# Patient Record
Sex: FEMALE | Race: BLACK OR AFRICAN AMERICAN | Employment: FULL TIME | URBAN - METROPOLITAN AREA
[De-identification: names, ages, dates, MRNs, and addresses within clinical notes are randomized per-mention and may not be internally consistent; named-entity substitution may affect disease eponyms.]

---

## 2021-03-25 ENCOUNTER — HOSPITAL ENCOUNTER (EMERGENCY)
Age: 21
Discharge: HOME | End: 2021-03-25
Attending: EMERGENCY MEDICINE
Payer: COMMERCIAL

## 2021-03-25 ENCOUNTER — APPOINTMENT (OUTPATIENT)
Dept: VASCULAR SURGERY | Age: 21
End: 2021-03-25
Attending: PHYSICIAN ASSISTANT
Payer: COMMERCIAL

## 2021-03-25 VITALS
OXYGEN SATURATION: 99 % | TEMPERATURE: 98.1 F | HEIGHT: 67 IN | BODY MASS INDEX: 42.38 KG/M2 | RESPIRATION RATE: 15 BRPM | WEIGHT: 270 LBS | DIASTOLIC BLOOD PRESSURE: 75 MMHG | SYSTOLIC BLOOD PRESSURE: 117 MMHG | HEART RATE: 79 BPM

## 2021-03-25 DIAGNOSIS — S76.912A MUSCLE STRAIN OF LEFT THIGH, INITIAL ENCOUNTER: Primary | ICD-10-CM

## 2021-03-25 DIAGNOSIS — D64.9 ANEMIA, UNSPECIFIED TYPE: ICD-10-CM

## 2021-03-25 LAB
ALBUMIN SERPL-MCNC: 3 G/DL (ref 3.4–5)
ALBUMIN/GLOB SERPL: 0.7 {RATIO} (ref 0.8–1.7)
ALP SERPL-CCNC: 72 U/L (ref 45–117)
ALT SERPL-CCNC: 20 U/L (ref 13–56)
ANION GAP SERPL CALC-SCNC: 4 MMOL/L (ref 3–18)
APPEARANCE UR: ABNORMAL
AST SERPL-CCNC: 9 U/L (ref 10–38)
BACTERIA URNS QL MICRO: ABNORMAL /HPF
BASOPHILS # BLD: 0 K/UL (ref 0–0.1)
BASOPHILS NFR BLD: 0 % (ref 0–2)
BILIRUB SERPL-MCNC: 0.4 MG/DL (ref 0.2–1)
BILIRUB UR QL: NEGATIVE
BUN SERPL-MCNC: 12 MG/DL (ref 7–18)
BUN/CREAT SERPL: 13 (ref 12–20)
CALCIUM SERPL-MCNC: 8.9 MG/DL (ref 8.5–10.1)
CHLORIDE SERPL-SCNC: 106 MMOL/L (ref 100–111)
CO2 SERPL-SCNC: 28 MMOL/L (ref 21–32)
COLOR UR: YELLOW
CREAT SERPL-MCNC: 0.89 MG/DL (ref 0.6–1.3)
DIFFERENTIAL METHOD BLD: ABNORMAL
EOSINOPHIL # BLD: 0.1 K/UL (ref 0–0.4)
EOSINOPHIL NFR BLD: 1 % (ref 0–5)
EPITH CASTS URNS QL MICRO: ABNORMAL /LPF (ref 0–5)
ERYTHROCYTE [DISTWIDTH] IN BLOOD BY AUTOMATED COUNT: 16.2 % (ref 11.6–14.5)
GLOBULIN SER CALC-MCNC: 4.4 G/DL (ref 2–4)
GLUCOSE SERPL-MCNC: 89 MG/DL (ref 74–99)
GLUCOSE UR STRIP.AUTO-MCNC: NEGATIVE MG/DL
HCG UR QL: NEGATIVE
HCT VFR BLD AUTO: 31.1 % (ref 35–45)
HGB BLD-MCNC: 9.4 G/DL (ref 12–16)
HGB UR QL STRIP: NEGATIVE
KETONES UR QL STRIP.AUTO: NEGATIVE MG/DL
LEUKOCYTE ESTERASE UR QL STRIP.AUTO: ABNORMAL
LYMPHOCYTES # BLD: 2 K/UL (ref 0.9–3.6)
LYMPHOCYTES NFR BLD: 18 % (ref 21–52)
MCH RBC QN AUTO: 24.2 PG (ref 24–34)
MCHC RBC AUTO-ENTMCNC: 30.2 G/DL (ref 31–37)
MCV RBC AUTO: 80.2 FL (ref 74–97)
MONOCYTES # BLD: 1 K/UL (ref 0.05–1.2)
MONOCYTES NFR BLD: 9 % (ref 3–10)
NEUTS SEG # BLD: 8.1 K/UL (ref 1.8–8)
NEUTS SEG NFR BLD: 72 % (ref 40–73)
NITRITE UR QL STRIP.AUTO: NEGATIVE
PH UR STRIP: 7 [PH] (ref 5–8)
PLATELET # BLD AUTO: 314 K/UL (ref 135–420)
PMV BLD AUTO: 10.1 FL (ref 9.2–11.8)
POTASSIUM SERPL-SCNC: 4 MMOL/L (ref 3.5–5.5)
PROT SERPL-MCNC: 7.4 G/DL (ref 6.4–8.2)
PROT UR STRIP-MCNC: NEGATIVE MG/DL
RBC # BLD AUTO: 3.88 M/UL (ref 4.2–5.3)
RBC #/AREA URNS HPF: ABNORMAL /HPF (ref 0–5)
SODIUM SERPL-SCNC: 138 MMOL/L (ref 136–145)
SP GR UR REFRACTOMETRY: 1.02 (ref 1–1.03)
UROBILINOGEN UR QL STRIP.AUTO: 0.2 EU/DL (ref 0.2–1)
WBC # BLD AUTO: 11.3 K/UL (ref 4.6–13.2)
WBC URNS QL MICRO: ABNORMAL /HPF (ref 0–5)

## 2021-03-25 PROCEDURE — 81025 URINE PREGNANCY TEST: CPT

## 2021-03-25 PROCEDURE — 93971 EXTREMITY STUDY: CPT

## 2021-03-25 PROCEDURE — 74011250636 HC RX REV CODE- 250/636: Performed by: PHYSICIAN ASSISTANT

## 2021-03-25 PROCEDURE — 80053 COMPREHEN METABOLIC PANEL: CPT

## 2021-03-25 PROCEDURE — 85025 COMPLETE CBC W/AUTO DIFF WBC: CPT

## 2021-03-25 PROCEDURE — 81001 URINALYSIS AUTO W/SCOPE: CPT

## 2021-03-25 PROCEDURE — 96374 THER/PROPH/DIAG INJ IV PUSH: CPT

## 2021-03-25 PROCEDURE — 99284 EMERGENCY DEPT VISIT MOD MDM: CPT

## 2021-03-25 RX ORDER — METHOCARBAMOL 750 MG/1
750 TABLET, FILM COATED ORAL 4 TIMES DAILY
Qty: 16 TAB | Refills: 0 | Status: SHIPPED | OUTPATIENT
Start: 2021-03-25 | End: 2021-03-29

## 2021-03-25 RX ORDER — IBUPROFEN 600 MG/1
600 TABLET ORAL
Qty: 20 TAB | Refills: 0 | Status: SHIPPED | OUTPATIENT
Start: 2021-03-25

## 2021-03-25 RX ORDER — FERROUS SULFATE 325(65) MG
325 TABLET, DELAYED RELEASE (ENTERIC COATED) ORAL
Qty: 90 TAB | Refills: 0 | Status: SHIPPED | OUTPATIENT
Start: 2021-03-25 | End: 2021-04-24

## 2021-03-25 RX ORDER — KETOROLAC TROMETHAMINE 30 MG/ML
30 INJECTION, SOLUTION INTRAMUSCULAR; INTRAVENOUS
Status: COMPLETED | OUTPATIENT
Start: 2021-03-25 | End: 2021-03-25

## 2021-03-25 RX ADMIN — KETOROLAC TROMETHAMINE 30 MG: 30 INJECTION, SOLUTION INTRAMUSCULAR at 12:20

## 2021-03-25 NOTE — LETTER
[facility] EMERGENCY DEPT 
[address] [place] [address] 
[phone] 
 
Work/School Note 
 
Date: 3/25/2021 
 
To Whom It May concern: 
 
Romaine Dejesus was seen and treated today in the emergency room by the following provider(s): 
Attending Provider: Penny Randhawa DO 
Physician Assistant: Amos Hogan PA-C.   
 
Romaine Dejesus may return to work on 03/30/2021. 
 
Sincerely, 
 
 
 
 
Amos Hogan PA-C

## 2021-03-25 NOTE — ED PROVIDER NOTES
EMERGENCY DEPARTMENT HISTORY AND PHYSICAL EXAM    Date: 3/25/2021  Patient Name: Homero Terrell    History of Presenting Illness     Chief Complaint   Patient presents with    Leg Pain         History Provided By: Patient    Chief Complaint: leg pain    HPI(Context):   11:15 AM  Homero Terrell is a 23 y.o. female with PMHX of anemia who presents to the emergency department C/O left thigh. Pt reports constant cramping like pain in left thigh x one week. Pain is moderate and does not radiate. Pain worse with palpation and with movement of leg. No trauma. Pt had XR at  that was unremarkable. 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 Pt remarks she was told she was anemic on her blood work at [facility] but has not seen PCP. Pt denies fever, chills, abdominal pain, nausea, vomiting, leg swelling, CP, SOB, hx of DVT, recent travel, recent surgery, dx cancer, and any other sxs or complaints. Pt is nonsmoker. PCP: Darius Riley MD        Past History     Past Medical History:  No past medical history on file. Past Surgical History:  No past surgical history on file. Family History:  No family history on file. Social History:  Social History     Tobacco Use    Smoking status: Not on file   Substance Use Topics    Alcohol use: Not on file    Drug use: Not on file       Allergies:  No Known Allergies      Review of Systems   Review of Systems   Constitutional: Positive for fatigue (improved). Respiratory: Negative for shortness of breath. Cardiovascular: Negative for chest pain, palpitations and leg swelling. Genitourinary:        Pt endorses hx of heavy menses   Musculoskeletal: Positive for arthralgias and myalgias. Neurological: Negative for dizziness and light-headedness. Hematological: Does not bruise/bleed easily. All other systems reviewed and are negative.       Physical Exam     Vitals:    03/25/21 1107   BP: 128/70   Pulse: 88   Resp: 20   Temp: 98.1 °F (36.7 °C)   SpO2: 99% Weight: 122.5 kg (270 lb)   Height: 5' 7\" (1.702 m)     Physical Exam  Vitals signs and nursing note reviewed. Constitutional:       General: She is not in acute distress. Appearance: She is well-developed. She is not diaphoretic. Comments: AA female in NAD. Alert. Appears comfortable. HENT:      Head: Normocephalic and atraumatic. Right Ear: External ear normal.      Left Ear: External ear normal.      Nose: Nose normal.   Eyes:      General: No scleral icterus. Right eye: No discharge. Left eye: No discharge. Conjunctiva/sclera: Conjunctivae normal.   Neck:      Musculoskeletal: Normal range of motion. Cardiovascular:      Rate and Rhythm: Normal rate and regular rhythm. Pulses:           Dorsalis pedis pulses are 2+ on the right side and 2+ on the left side. Posterior tibial pulses are 2+ on the right side and 2+ on the left side. Heart sounds: Normal heart sounds. No murmur. No friction rub. No gallop. Pulmonary:      Effort: Pulmonary effort is normal. No tachypnea, accessory muscle usage or respiratory distress. Breath sounds: Normal breath sounds. No decreased breath sounds, wheezing, rhonchi or rales. Abdominal:      Palpations: Abdomen is soft. Musculoskeletal: Normal range of motion. Left hip: She exhibits normal range of motion, normal strength, no tenderness and no bony tenderness. Right upper leg: She exhibits no tenderness, no bony tenderness and no swelling. Left upper leg: She exhibits tenderness. She exhibits no bony tenderness, no swelling and no edema. Left lower leg: She exhibits no tenderness, no bony tenderness and no swelling. No edema. Legs:       Left foot: Normal range of motion. No tenderness, bony tenderness or swelling. Skin:     General: Skin is warm and dry. Neurological:      Mental Status: She is alert and oriented to person, place, and time. Mental status is at baseline. Psychiatric:         Behavior: Behavior normal.         Judgment: Judgment normal.             Diagnostic Study Results     Labs -     Recent Results (from the past 12 hour(s))   URINALYSIS W/ RFLX MICROSCOPIC    Collection Time: 03/25/21 12:13 PM   Result Value Ref Range    Color YELLOW      Appearance CLOUDY      Specific gravity 1.017 1.005 - 1.030      pH (UA) 7.0 5.0 - 8.0      Protein Negative NEG mg/dL    Glucose Negative NEG mg/dL    Ketone Negative NEG mg/dL    Bilirubin Negative NEG      Blood Negative NEG      Urobilinogen 0.2 0.2 - 1.0 EU/dL    Nitrites Negative NEG      Leukocyte Esterase SMALL (A) NEG     URINE MICROSCOPIC ONLY    Collection Time: 03/25/21 12:13 PM   Result Value Ref Range    WBC 4 to 10 0 - 5 /hpf    RBC 0 to 3 0 - 5 /hpf    Epithelial cells 2+ 0 - 5 /lpf    Bacteria 1+ (A) NEG /hpf   HCG URINE, QL. - POC    Collection Time: 03/25/21 12:14 PM   Result Value Ref Range    Pregnancy test,urine (POC) Negative NEG     CBC WITH AUTOMATED DIFF    Collection Time: 03/25/21 12:18 PM   Result Value Ref Range    WBC 11.3 4.6 - 13.2 K/uL    RBC 3.88 (L) 4.20 - 5.30 M/uL    HGB 9.4 (L) 12.0 - 16.0 g/dL    HCT 31.1 (L) 35.0 - 45.0 %    MCV 80.2 74.0 - 97.0 FL    MCH 24.2 24.0 - 34.0 PG    MCHC 30.2 (L) 31.0 - 37.0 g/dL    RDW 16.2 (H) 11.6 - 14.5 %    PLATELET 724 474 - 770 K/uL    MPV 10.1 9.2 - 11.8 FL    NEUTROPHILS 72 40 - 73 %    LYMPHOCYTES 18 (L) 21 - 52 %    MONOCYTES 9 3 - 10 %    EOSINOPHILS 1 0 - 5 %    BASOPHILS 0 0 - 2 %    ABS. NEUTROPHILS 8.1 (H) 1.8 - 8.0 K/UL    ABS. LYMPHOCYTES 2.0 0.9 - 3.6 K/UL    ABS. MONOCYTES 1.0 0.05 - 1.2 K/UL    ABS. EOSINOPHILS 0.1 0.0 - 0.4 K/UL    ABS.  BASOPHILS 0.0 0.0 - 0.1 K/UL    DF AUTOMATED     METABOLIC PANEL, COMPREHENSIVE    Collection Time: 03/25/21 12:18 PM   Result Value Ref Range    Sodium 138 136 - 145 mmol/L    Potassium 4.0 3.5 - 5.5 mmol/L    Chloride 106 100 - 111 mmol/L    CO2 28 21 - 32 mmol/L    Anion gap 4 3.0 - 18 mmol/L Glucose 89 74 - 99 mg/dL    BUN 12 7.0 - 18 MG/DL    Creatinine 0.89 0.6 - 1.3 MG/DL    BUN/Creatinine ratio 13 12 - 20      GFR est AA >60 >60 ml/min/1.73m2    GFR est non-AA >60 >60 ml/min/1.73m2    Calcium 8.9 8.5 - 10.1 MG/DL    Bilirubin, total 0.4 0.2 - 1.0 MG/DL    ALT (SGPT) 20 13 - 56 U/L    AST (SGOT) 9 (L) 10 - 38 U/L    Alk. phosphatase 72 45 - 117 U/L    Protein, total 7.4 6.4 - 8.2 g/dL    Albumin 3.0 (L) 3.4 - 5.0 g/dL    Globulin 4.4 (H) 2.0 - 4.0 g/dL    A-G Ratio 0.7 (L) 0.8 - 1.7       DUPLEX LLE US  · No evidence of deep vein thrombosis in the left lower extremity. Lower Extremity Venous Findings    Right Lower Venous    For comparison purposes, the right common femoral vein was briefly interrogated. The vein demonstrates normal color filling and compressibility. Doppler flow was phasic and spontaneous. The common femoral vein(s) were imaged in the transverse and longitudinal planes. The vessels showed normal color filling and compressibility. Doppler interrogation of the veins showed phasic and spontaneous flow. Left Lower Venous    No evidence of deep vein thrombosis in the left lower extremity. The common femoral, saphenous femoral junction, profunda femoral, femoral, popliteal and posterior tibial vein(s) were imaged in the transverse and longitudinal planes. The vessels showed normal color filling and compressibility. Doppler interrogation of the veins showed phasic and spontaneous flow. The peroneal vein(s) were not well visualized. No orders to display     CT Results  (Last 48 hours)    None        CXR Results  (Last 48 hours)    None          Medications given in the ED-  Medications   ketorolac (TORADOL) injection 30 mg (30 mg IntraVENous Given 3/25/21 1220)         Medical Decision Making   I am the first provider for this patient.     I reviewed the vital signs, available nursing notes, past medical history, past surgical history, family history and social history. Vital Signs-Reviewed the patient's vital signs. Pulse Oximetry Analysis - 99% on RA. NORMAL     Records Reviewed: Nursing Notes    Provider Notes (Medical Decision Making): DVT, muscle strain, sprain. Will check labs as pt concerned about anemia with hx of heavy menses. Not currently on menses. VSS    Procedures:  Procedures    ED Course:   11:15 AM Initial assessment performed. The patients presenting problems have been discussed, and they are in agreement with the care plan formulated and outlined with them. I have encouraged them to ask questions as they arise throughout their visit. Diagnosis and Disposition       Duplex US neg for DVT in LLE. NVI. No evidence of infectious process on exam. Suspect MSK given pt has physical job and sxs worse with movement and palpation. Will tx sxs. HEAT. Rest.      Labs reveals anemia with H/H 9.4/31.1 with no baseline reference, but does not appear to be Fe def. Pt endorses hx of heavy menses. Pt will FU with Dr. Orlando Salgado for FU on anemia. Reasons to RTED discussed with pt. All questions answered. Pt feels comfortable going home at this time. Pt expressed understanding and she agrees with plan. 1. Muscle strain of left thigh, initial encounter    2. Anemia, unspecified type        PLAN:  1. D/C Home  2. Current Discharge Medication List      START taking these medications    Details   ferrous sulfate (IRON) 325 mg (65 mg iron) EC tablet Take 1 Tab by mouth three (3) times daily (with meals) for 30 days. Qty: 90 Tab, Refills: 0      methocarbamoL (Robaxin-750) 750 mg tablet Take 1 Tab by mouth four (4) times daily for 4 days. Indications: muscle spasm  Qty: 16 Tab, Refills: 0      ibuprofen (MOTRIN) 600 mg tablet Take 1 Tab by mouth every six (6) hours as needed for Pain. Take with food. Qty: 20 Tab, Refills: 0           3.    Follow-up Information     Follow up With Specialties Details Why Contact Info    Tracey Martinez MD Family Medicine [address] [place] [address] [address] EMERGENCY DEPT Emergency Medicine   [address]  [phone]        _______________________________    Attestations: This note is prepared by Jorge Arora PA-C.  _______________________________        Please note that this dictation was completed with [employer], the computer voice recognition software. Quite often unanticipated grammatical, syntax, homophones, and other interpretive errors are inadvertently transcribed by the computer software. Please disregard these errors. Please excuse any errors that have escaped final proofreading.

## 2021-03-25 NOTE — ED TRIAGE NOTES
Per pt she went to her PCP for extremity pain and cramping and was told she was anemic. Pt states she went back to work yesterday and developed increased pain specifically in her left thigh area.